# Patient Record
Sex: FEMALE | Race: WHITE
[De-identification: names, ages, dates, MRNs, and addresses within clinical notes are randomized per-mention and may not be internally consistent; named-entity substitution may affect disease eponyms.]

---

## 2018-07-30 ENCOUNTER — HOSPITAL ENCOUNTER (EMERGENCY)
Dept: HOSPITAL 45 - C.EDB | Age: 43
Discharge: HOME | End: 2018-07-30
Payer: COMMERCIAL

## 2018-07-30 VITALS — SYSTOLIC BLOOD PRESSURE: 145 MMHG | HEART RATE: 103 BPM | OXYGEN SATURATION: 97 % | DIASTOLIC BLOOD PRESSURE: 96 MMHG

## 2018-07-30 VITALS
BODY MASS INDEX: 41.03 KG/M2 | BODY MASS INDEX: 41.03 KG/M2 | WEIGHT: 286.6 LBS | HEIGHT: 70 IN | HEIGHT: 70 IN | WEIGHT: 286.6 LBS

## 2018-07-30 VITALS — TEMPERATURE: 97.88 F

## 2018-07-30 DIAGNOSIS — S89.92XA: Primary | ICD-10-CM
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
## 2018-07-30 DIAGNOSIS — Z91.018: ICD-10-CM
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
## 2018-07-30 NOTE — EMERGENCY ROOM VISIT NOTE
ED Visit Note


First contact with patient:  00:49


CHIEF COMPLAINT:  knee pain 





HISTORY OF PRESENT ILLNESS:  This 42-year-old patient presents to the emergency 

department with son after sustaining an injury to the left knee while at work 

tonight who works at the [employer].  The patient states a patient at the [employer] 

kicked her in the left knee and cause severe pain.  She has iced it.  She 

cannot bear weight.  The patient denies any other injuries besides their knee.  

The patient denies swelling or bruising.  There is pain diffusely.  They rate 

the pain as throbbing and 7/10.  The patient states they are not able to walk 

on it.  No numbness or tingling.  No previous injuries to this knee.  No ankle, 

foot or hip pain.





REVIEW OF SYSTEMS:   A 6 system review of systems was completed with positives 

and pertinent negatives listed in the HPI. 





ALLERGIES: Coconut





MEDICATIONS: Reviewed





PMH:  Medical Problems:


(1) bladder repair


Status: Resolved  





(2)  section


Status: Resolved  





(3) Gestational diabetes mellitus


Status: Resolved  





(4) History of cholecystectomy


Status: Resolved  





(5) History of reduction of breast


Status: Resolved  





(6) Obesity


Status: Chronic  





(7) Oophorectomy


Status: Resolved  





(8) Polycystic ovaries


Status: Chronic  





SOCIAL HISTORY: No drug use





PHYSICAL EXAM: Vital Signs: Reviewed Nurse's notes, vital signs hypertensive.  

GENERAL: Pleasant female who appears in pain, no acute distress, but appears in 

pain, well-developed, well-nourished.  MENTAL STATUS: Alert, oriented to person 

place and time, and cooperative.  MUSCULOSKELETAL:  The left knee is minimally 

swollen. There is no ecchymosis.  There is no joint effusion present. The 

patient is tender diffusely. There is  joint line tenderness. The patella not 

subluxate. Range of motion is limited secondary to pain. Strength of the quads 

and hamstrings is 4/5. Grace's is unable to assess secondary to pain. Lachman'

s and Anterior Drawer tests are unable to assess secondary to pain. There is 

pain with with varus and valgus stressing.  The foot and toes are warm and well-

perfused.  Dorsalis pedis pulse 2+.    Sensation to pain and light touch is 

intact.  Capillary refill less than 2 seconds.





EMERGENCY DEPARTMENT COURSE:  I examined the patient.  X-rays of the left knee 

were reviewed by myself and my attending and reveal no fracture.  The patient 

was placed in a knee immobilizer under my direction and the position was 

satisfactory.  The patient was instructed on the use of crutches.  Patient's 

work injury paperwork was filled out and given back to her.  She was encouraged 

to follow-up with her Worker's [employer] orthopedic doctor this week or here in the 

ER sooner for severe pain, numbness, tingling, worsening signs or symptoms or 

as needed.  The patient was discharged home in good condition.





Differential diagnoses include sprain, strain, fracture, dislocation, effusion, 

meniscal injury and other etiologies were considered.





DIAGNOSIS: #1 left knee injury #2 work-related injury





DISCHARGE INSTRUCTIONS: 


As below


Problem List


Medical Problems:


(1) bladder repair


Status: Resolved  





(2)  section


Status: Resolved  





(3) Gestational diabetes mellitus


Status: Resolved  





(4) History of cholecystectomy


Status: Resolved  





(5) History of reduction of breast


Status: Resolved  





(6) Obesity


Status: Chronic  





(7) Oophorectomy


Status: Resolved  





(8) Polycystic ovaries


Status: Chronic  











Current/Historical Medications


Scheduled


Albuterol (Proair Hfa), 2 PUFFS INH Q4HR PRN


Cyclobenzaprine Hcl (Flexeril), 10 MG PO TID PRN


Oxycodone/Acetaminophen 5MG/325MG (Oxycodone/Acetaminophen 5MG/325MG), 1-2 

TABLETS PO Q4HR PRN


[Riboflavin], 400 MG PO DAILY





Allergies


Coded Allergies:  


     Coconut (Unverified  Allergy, Unknown, ANAPHYLAXIS, 16)





Vital Signs











  Date Time  Temp Pulse Resp B/P (MAP) Pulse Ox O2 Delivery O2 Flow Rate FiO2


 


18 01:19   18 144/114  Room Air  


 


18 00:44 36.6 111 18  98 Room Air  











Medications Administered











 Medications


  (Trade)  Dose


 Ordered  Sig/Jd


 Route  Start Time


 Stop Time Status Last Admin


Dose Admin


 


 Oxycodone HCl


  (Roxicodone


 Immediate Rel Tab)  5 mg  NOW  STAT


 PO  18 00:58


 18 01:01 DC 18 01:10


5 MG


 


 Ibuprofen


  (Motrin Tab)  600 mg  NOW  STAT


 PO  18 00:58


 18 01:01 DC 18 01:11


600 MG











Departure Information


Impression





 Primary Impression:  


 Left knee injury


 Additional Impression:  


 Work related injury





Dispostion


Home / Self-Care





Condition


GOOD





Forms


HOME CARE DOCUMENTATION FORM,                                                 

               Work Instructions,    Return To Work:  2 days


      


   IMPORTANT VISIT INFORMATION





Patient Instructions


[facility]





Additional Instructions





DO NOT drive, drink alcohol, operate machinery, or perform dangerous activities 

today.  You were given medications in the ER that can affect your ability to 

safely function or operate a vehicle.





Oxycodone (OxyIR) 5mg: Take 1-2 pills every four hours for breakthrough pain.  

Avoid alcohol, operating machinery or dangerous equipment, working on ladders 

or roofs, DRIVING, or situations where being under the influence may be 

dangerous.  It is recommended to use an over-the-counter stool softener such as 

Colace, 100mg twice daily while taking this medication to avoid constipation. 





Ibuprofen(Motrin, Advil) may be used for fever or pain.  Use 600mg every six 

hours as needed.  Take with food.  Avoid using more than 2400mg in a 24 hour 

period.  Do not use 2400mg per day for more than three consecutive days without 

physician direction.  Prolonged inappropriate use can lead to stomach upset or 

ulcers.  This medication can be taken if you need to drive, work, or perform 

activities which may be dangerous when taking narcotic pain medication.


(AND/OR)


Acetaminophen(Tylenol) may be used for fever or pain.  Use 1000mg every six 

hours as needed.  Avoid using more than 3000mg in a 24 hour period.  This 

medication can be taken if you need to drive, work, or perform activities which 

may be dangerous when taking narcotic pain medication.





Ice compresses for 20 minutes at a time four times daily for 2-3 days.





Use the crutches as instructed. 





Rest and elevate your injury.





Wear knee immobilizer when up and about.  Do not have it so tight that you 

cannot feel your foot.





Continue current medications.





Return to the ER immediately for any numbness, tingling, severe pain, extreme 

swelling in the extremity or as needed.





Call your works  Worker's [employer] orthopedics tomorrow to arrange follow up for 

your injury.





Work Instructions


Return To Work:  2 days





Problem Qualifiers

## 2018-07-30 NOTE — DIAGNOSTIC IMAGING REPORT
L KNEE 3 VIEWS



HISTORY:  42 years-old Female kicked in knee by pt acute left knee pain



COMPARISON: Left knee radiographs 1/7/2011



TECHNIQUE: 3 views of the left knee



FINDINGS: 

Mild medial soft tissue swelling. No large joint effusion, acute fracture or

dislocation. Mild tricompartmental osteoarthritis with mildly progressive joint

space narrowing involving the medial compartment from comparison study.

Enthesophytes about the patella at the quadriceps and patellar insertion sites.



IMPRESSION: 

1. Mild medial soft tissue swelling without acute fracture or dislocation.

2. Mild tricompartmental osteoarthritis. 







The above report was generated using voice recognition software. It may contain

grammatical, syntax or spelling errors.







Electronically signed by:  Braxton Bhat M.D.

7/30/2018 6:38 AM



Dictated Date/Time:  7/30/2018 6:37 AM